# Patient Record
Sex: MALE | Race: OTHER | HISPANIC OR LATINO | URBAN - METROPOLITAN AREA
[De-identification: names, ages, dates, MRNs, and addresses within clinical notes are randomized per-mention and may not be internally consistent; named-entity substitution may affect disease eponyms.]

---

## 2017-07-09 ENCOUNTER — EMERGENCY (EMERGENCY)
Facility: HOSPITAL | Age: 47
LOS: 1 days | Discharge: HOME | End: 2017-07-09
Attending: EMERGENCY MEDICINE
Payer: SELF-PAY

## 2017-07-09 VITALS
HEIGHT: 63 IN | SYSTOLIC BLOOD PRESSURE: 149 MMHG | HEART RATE: 70 BPM | WEIGHT: 158.07 LBS | OXYGEN SATURATION: 98 % | RESPIRATION RATE: 18 BRPM | DIASTOLIC BLOOD PRESSURE: 83 MMHG | TEMPERATURE: 97 F

## 2017-07-09 PROCEDURE — 70450 CT HEAD/BRAIN W/O DYE: CPT

## 2017-07-09 PROCEDURE — 99284 EMERGENCY DEPT VISIT MOD MDM: CPT | Mod: 25

## 2017-07-09 PROCEDURE — T1013: CPT

## 2017-07-09 PROCEDURE — 70450 CT HEAD/BRAIN W/O DYE: CPT | Mod: 26

## 2017-07-09 PROCEDURE — 99284 EMERGENCY DEPT VISIT MOD MDM: CPT

## 2017-07-09 RX ORDER — MECLIZINE HCL 12.5 MG
1 TABLET ORAL
Qty: 30 | Refills: 0
Start: 2017-07-09 | End: 2017-07-19

## 2017-07-09 RX ORDER — MECLIZINE HCL 12.5 MG
25 TABLET ORAL ONCE
Qty: 0 | Refills: 0 | Status: COMPLETED | OUTPATIENT
Start: 2017-07-09 | End: 2017-07-09

## 2017-07-09 RX ADMIN — Medication 25 MILLIGRAM(S): at 15:28

## 2017-07-09 NOTE — ED PROVIDER NOTE - ATTENDING CONTRIBUTION TO CARE
46 y/o M seen and evaluated with P for dizziness = vertigo  slight headache but neuro intact  denies trauma, blood thinners  Vitals = wnl, exam = as documeted, neuro = intact  I = vertigo, peripheral; treat with meclizine and reassess, consider HCT  outpatient f/u as instructed.

## 2017-07-09 NOTE — ED ADULT TRIAGE NOTE - CHIEF COMPLAINT QUOTE
c/o dizziness, " my head feels warm" which is making him vomit. x 4 days." was told at pharmacy to drink something to feel better and its not working" " over heating of the brain "

## 2017-07-09 NOTE — ED PROVIDER NOTE - PROGRESS NOTE DETAILS
NP NOTE:  Dizziness resolved.  CT scan unremarkable.  Will d/c home with rx meclazine and refer to [facility] for f/u.

## 2017-07-09 NOTE — ED PROVIDER NOTE - NEUROLOGICAL, MLM
Alert and oriented, no focal deficits, no motor or sensory deficits. No ataxia or pronator, negative Romberg

## 2017-11-27 ENCOUNTER — EMERGENCY (EMERGENCY)
Facility: HOSPITAL | Age: 47
LOS: 1 days | Discharge: HOME | End: 2017-11-27
Attending: EMERGENCY MEDICINE | Admitting: EMERGENCY MEDICINE
Payer: SELF-PAY

## 2017-11-27 VITALS
DIASTOLIC BLOOD PRESSURE: 81 MMHG | WEIGHT: 149.91 LBS | SYSTOLIC BLOOD PRESSURE: 152 MMHG | RESPIRATION RATE: 18 BRPM | OXYGEN SATURATION: 98 % | HEART RATE: 72 BPM | HEIGHT: 62 IN | TEMPERATURE: 98 F

## 2017-11-27 PROCEDURE — T1013: CPT

## 2017-11-27 PROCEDURE — 99283 EMERGENCY DEPT VISIT LOW MDM: CPT

## 2017-11-27 PROCEDURE — 99283 EMERGENCY DEPT VISIT LOW MDM: CPT | Mod: 25

## 2017-11-27 PROCEDURE — 73030 X-RAY EXAM OF SHOULDER: CPT | Mod: 26,LT

## 2017-11-27 PROCEDURE — 73030 X-RAY EXAM OF SHOULDER: CPT

## 2017-11-27 RX ORDER — IBUPROFEN 200 MG
1 TABLET ORAL
Qty: 15 | Refills: 0
Start: 2017-11-27 | End: 2017-12-02

## 2017-11-27 RX ORDER — IBUPROFEN 200 MG
800 TABLET ORAL ONCE
Qty: 0 | Refills: 0 | Status: COMPLETED | OUTPATIENT
Start: 2017-11-27 | End: 2017-11-27

## 2017-11-27 RX ADMIN — Medication 800 MILLIGRAM(S): at 11:40

## 2017-11-27 RX ADMIN — Medication 800 MILLIGRAM(S): at 11:10

## 2017-11-27 NOTE — ED STATDOCS - PROGRESS NOTE DETAILS
X-ray of shoulder WNL. No acute fracture noted. no widening of A/C joint. Normal ROM present , Normal strength. Pt states some relief with pain medication in ED. Pt will F/U with Orthopedic  clinic as discussed.

## 2017-11-27 NOTE — ED ADULT TRIAGE NOTE - CHIEF COMPLAINT QUOTE
"I was working and I felt like I tore something, I feel like pain and my arm hurts all the way down the bone and my pointer finger is numb. " Pt states this happened 3-4 days ago.

## 2017-11-27 NOTE — ED ADULT NURSE NOTE - OBJECTIVE STATEMENT
pt presents to ED with left shoulder pain radiating to right hand x two weeks s/p lifting something heavy. pt sc/o numbness to left index finger. +ROM noted. a&ox3, breathing is even and unlabored. a&ox3. will continue to monitor and reassess

## 2017-11-27 NOTE — ED STATDOCS - ATTENDING CONTRIBUTION TO CARE
I, Sotero Vega, performed the initial face to face bedside interview with this patient regarding history of present illness, review of symptoms and relevant past medical, social and family history.  I completed an independent physical examination.  I was the provider who initially evaluated this patient.  The history, relevant review of systems, past medical and surgical history, medical decision making, and physical examination was documented by the scribe in my presence and I attest to the accuracy of the documentation. Follow-up on ordered tests (ie labs, radiologic studies) and re-evaluation of the patient's status has been communicated to the ACP.  Disposition of the patient will be based on test outcome and response to ED interventions.

## 2017-11-27 NOTE — ED STATDOCS - CARE PLAN
Principal Discharge DX:	Acute pain of left shoulder  Instructions for follow-up, activity and diet:	Continue with pain medication and F/u with Medical clinic and Orthopedic.

## 2017-11-27 NOTE — ED STATDOCS - OBJECTIVE STATEMENT
This is a 46 y/o M presents to ED c/o L shoulder pain x 6 days. Denies N/V/D, fever, chills, SOB, CP, difficulty breathing, HA, numbness, tingling and abd pain.  Pt states he was lifting a wheel barrel full of stones for work and felt as if he strained his shoulder by the end of the day. On piroxicam. R hand dominant.  : Evy This is a 46 y/o M presents to ED c/o L shoulder pain x 6 days. Pt reports heavy lifting at work last week:  he was lifting a wheel barrel full of stones at work and noted shoulder pain at the end of the day, no acute injury.  Reports worsening symptoms since.  Obtain medications at [place]. Denies prior shoulder problems.  R hand dominant.  : Evy

## 2019-04-01 ENCOUNTER — EMERGENCY (EMERGENCY)
Facility: HOSPITAL | Age: 49
LOS: 1 days | End: 2019-04-01
Attending: STUDENT IN AN ORGANIZED HEALTH CARE EDUCATION/TRAINING PROGRAM
Payer: SELF-PAY

## 2019-04-01 VITALS
RESPIRATION RATE: 18 BRPM | SYSTOLIC BLOOD PRESSURE: 97 MMHG | DIASTOLIC BLOOD PRESSURE: 61 MMHG | WEIGHT: 145.06 LBS | OXYGEN SATURATION: 100 % | HEIGHT: 62 IN | HEART RATE: 59 BPM | TEMPERATURE: 98 F

## 2019-04-01 LAB
ALBUMIN SERPL ELPH-MCNC: 4.7 G/DL — SIGNIFICANT CHANGE UP (ref 3.3–5.2)
ALP SERPL-CCNC: 88 U/L — SIGNIFICANT CHANGE UP (ref 40–120)
ALT FLD-CCNC: 43 U/L — HIGH
ANION GAP SERPL CALC-SCNC: 15 MMOL/L — SIGNIFICANT CHANGE UP (ref 5–17)
AST SERPL-CCNC: 142 U/L — HIGH
BASOPHILS # BLD AUTO: 0 K/UL — SIGNIFICANT CHANGE UP (ref 0–0.2)
BASOPHILS NFR BLD AUTO: 0.2 % — SIGNIFICANT CHANGE UP (ref 0–2)
BILIRUB SERPL-MCNC: 0.8 MG/DL — SIGNIFICANT CHANGE UP (ref 0.4–2)
BUN SERPL-MCNC: 15 MG/DL — SIGNIFICANT CHANGE UP (ref 8–20)
CALCIUM SERPL-MCNC: 9.8 MG/DL — SIGNIFICANT CHANGE UP (ref 8.6–10.2)
CHLORIDE SERPL-SCNC: 102 MMOL/L — SIGNIFICANT CHANGE UP (ref 98–107)
CO2 SERPL-SCNC: 22 MMOL/L — SIGNIFICANT CHANGE UP (ref 22–29)
CREAT SERPL-MCNC: 0.62 MG/DL — SIGNIFICANT CHANGE UP (ref 0.5–1.3)
EOSINOPHIL # BLD AUTO: 0 K/UL — SIGNIFICANT CHANGE UP (ref 0–0.5)
EOSINOPHIL NFR BLD AUTO: 0.3 % — SIGNIFICANT CHANGE UP (ref 0–5)
GLUCOSE SERPL-MCNC: 137 MG/DL — HIGH (ref 70–115)
HCT VFR BLD CALC: 44.1 % — SIGNIFICANT CHANGE UP (ref 42–52)
HGB BLD-MCNC: 15.2 G/DL — SIGNIFICANT CHANGE UP (ref 14–18)
LIDOCAIN IGE QN: 19 U/L — LOW (ref 22–51)
LYMPHOCYTES # BLD AUTO: 1.3 K/UL — SIGNIFICANT CHANGE UP (ref 1–4.8)
LYMPHOCYTES # BLD AUTO: 7.9 % — LOW (ref 20–55)
MCHC RBC-ENTMCNC: 31.3 PG — HIGH (ref 27–31)
MCHC RBC-ENTMCNC: 34.5 G/DL — SIGNIFICANT CHANGE UP (ref 32–36)
MCV RBC AUTO: 90.9 FL — SIGNIFICANT CHANGE UP (ref 80–94)
MONOCYTES # BLD AUTO: 0.8 K/UL — SIGNIFICANT CHANGE UP (ref 0–0.8)
MONOCYTES NFR BLD AUTO: 4.7 % — SIGNIFICANT CHANGE UP (ref 3–10)
NEUTROPHILS # BLD AUTO: 13.8 K/UL — HIGH (ref 1.8–8)
NEUTROPHILS NFR BLD AUTO: 86.4 % — HIGH (ref 37–73)
PLATELET # BLD AUTO: 203 K/UL — SIGNIFICANT CHANGE UP (ref 150–400)
POTASSIUM SERPL-MCNC: 3.9 MMOL/L — SIGNIFICANT CHANGE UP (ref 3.5–5.3)
POTASSIUM SERPL-SCNC: 3.9 MMOL/L — SIGNIFICANT CHANGE UP (ref 3.5–5.3)
PROT SERPL-MCNC: 7.9 G/DL — SIGNIFICANT CHANGE UP (ref 6.6–8.7)
RBC # BLD: 4.85 M/UL — SIGNIFICANT CHANGE UP (ref 4.6–6.2)
RBC # FLD: 12.9 % — SIGNIFICANT CHANGE UP (ref 11–15.6)
SODIUM SERPL-SCNC: 139 MMOL/L — SIGNIFICANT CHANGE UP (ref 135–145)
WBC # BLD: 16 K/UL — HIGH (ref 4.8–10.8)
WBC # FLD AUTO: 16 K/UL — HIGH (ref 4.8–10.8)

## 2019-04-01 PROCEDURE — 99284 EMERGENCY DEPT VISIT MOD MDM: CPT

## 2019-04-01 RX ORDER — SODIUM CHLORIDE 9 MG/ML
3 INJECTION INTRAMUSCULAR; INTRAVENOUS; SUBCUTANEOUS ONCE
Qty: 0 | Refills: 0 | Status: COMPLETED | OUTPATIENT
Start: 2019-04-01 | End: 2019-04-01

## 2019-04-01 RX ORDER — KETOROLAC TROMETHAMINE 30 MG/ML
15 SYRINGE (ML) INJECTION ONCE
Qty: 0 | Refills: 0 | Status: DISCONTINUED | OUTPATIENT
Start: 2019-04-01 | End: 2019-04-01

## 2019-04-01 RX ORDER — LIDOCAINE 4 G/100G
10 CREAM TOPICAL ONCE
Qty: 0 | Refills: 0 | Status: COMPLETED | OUTPATIENT
Start: 2019-04-01 | End: 2019-04-01

## 2019-04-01 RX ORDER — FAMOTIDINE 10 MG/ML
20 INJECTION INTRAVENOUS ONCE
Qty: 0 | Refills: 0 | Status: COMPLETED | OUTPATIENT
Start: 2019-04-01 | End: 2019-04-01

## 2019-04-01 RX ORDER — SODIUM CHLORIDE 9 MG/ML
1000 INJECTION INTRAMUSCULAR; INTRAVENOUS; SUBCUTANEOUS ONCE
Qty: 0 | Refills: 0 | Status: COMPLETED | OUTPATIENT
Start: 2019-04-01 | End: 2019-04-01

## 2019-04-01 RX ORDER — ONDANSETRON 8 MG/1
4 TABLET, FILM COATED ORAL ONCE
Qty: 0 | Refills: 0 | Status: COMPLETED | OUTPATIENT
Start: 2019-04-01 | End: 2019-04-01

## 2019-04-01 RX ADMIN — Medication 15 MILLIGRAM(S): at 21:55

## 2019-04-01 RX ADMIN — Medication 15 MILLIGRAM(S): at 22:15

## 2019-04-01 RX ADMIN — LIDOCAINE 10 MILLILITER(S): 4 CREAM TOPICAL at 21:56

## 2019-04-01 RX ADMIN — FAMOTIDINE 20 MILLIGRAM(S): 10 INJECTION INTRAVENOUS at 21:55

## 2019-04-01 RX ADMIN — ONDANSETRON 4 MILLIGRAM(S): 8 TABLET, FILM COATED ORAL at 21:55

## 2019-04-01 RX ADMIN — SODIUM CHLORIDE 3 MILLILITER(S): 9 INJECTION INTRAMUSCULAR; INTRAVENOUS; SUBCUTANEOUS at 22:09

## 2019-04-01 RX ADMIN — SODIUM CHLORIDE 1000 MILLILITER(S): 9 INJECTION INTRAMUSCULAR; INTRAVENOUS; SUBCUTANEOUS at 23:00

## 2019-04-01 RX ADMIN — SODIUM CHLORIDE 1000 MILLILITER(S): 9 INJECTION INTRAMUSCULAR; INTRAVENOUS; SUBCUTANEOUS at 21:54

## 2019-04-01 RX ADMIN — Medication 30 MILLILITER(S): at 21:56

## 2019-04-01 NOTE — ED PROVIDER NOTE - CLINICAL SUMMARY MEDICAL DECISION MAKING FREE TEXT BOX
Pt with acute stomach upset likely enteritis secondary to food intake, will treat with hydration, pain control, check labs, and reeval

## 2019-04-01 NOTE — ED PROVIDER NOTE - OBJECTIVE STATEMENT
50 y/o M, with no pertinent medical hx, presents to the ED c/o intermittent epigastric pain, onset yesterday afternoon.  Pain is burning in nature and radiates up to the throat.  Associated sx include N/V/D.  Pt notes 3-4 episodes of non bloody, non bilious emesis, as well as non bloody diarrhea since approximately 4:00PM today.  Self medicating with Motrin, with no relief.  Last dose 2 hrs ago.  Pt states that sx onset yesterday sx onset yesterday afternoon after eating soup.  Sx resolved and then returned this afternoon after eating.  Notes similar sx last year and states that he was seen in the ED.  Denies fever, chills, chest pain, SOB, cough, back pain, or HA.  HPI translated by ED

## 2019-04-01 NOTE — ED PROVIDER NOTE - PROGRESS NOTE DETAILS
Pt reevaluated with ED  at bedside, pt states that he is feeling better after medications, pt stable for d/c home and outpatient follow up

## 2019-04-01 NOTE — ED PROVIDER NOTE - CONSTITUTIONAL, MLM
normal... ill appearing, uncomfortable, well nourished, awake, alert, oriented to person, place, time/situation

## 2019-04-01 NOTE — ED ADULT TRIAGE NOTE - CHIEF COMPLAINT QUOTE
Pt c/o abdominal pain, vomiting, diarrhea and fever, did not take temperature but took motrin at 4:00, states he thinks the soup he ate yesterday is making him sick

## 2019-04-01 NOTE — ED ADULT NURSE NOTE - OBJECTIVE STATEMENT
Assumed care of patient at 2200, alert and oriented x4, c/o abdominal pain, n.v.d since yesterday. IV placed labs sent, medications given per MD order. Safely maintained, no s/s of distress noted at this time.

## 2019-04-02 VITALS
DIASTOLIC BLOOD PRESSURE: 82 MMHG | OXYGEN SATURATION: 98 % | TEMPERATURE: 100 F | RESPIRATION RATE: 18 BRPM | HEART RATE: 67 BPM | SYSTOLIC BLOOD PRESSURE: 126 MMHG

## 2019-04-02 PROCEDURE — T1013: CPT

## 2019-04-02 PROCEDURE — 36415 COLL VENOUS BLD VENIPUNCTURE: CPT

## 2019-04-02 PROCEDURE — 96361 HYDRATE IV INFUSION ADD-ON: CPT

## 2019-04-02 PROCEDURE — 80053 COMPREHEN METABOLIC PANEL: CPT

## 2019-04-02 PROCEDURE — 85027 COMPLETE CBC AUTOMATED: CPT

## 2019-04-02 PROCEDURE — 99284 EMERGENCY DEPT VISIT MOD MDM: CPT | Mod: 25

## 2019-04-02 PROCEDURE — 83690 ASSAY OF LIPASE: CPT

## 2019-04-02 PROCEDURE — 96374 THER/PROPH/DIAG INJ IV PUSH: CPT

## 2019-04-02 PROCEDURE — 96375 TX/PRO/DX INJ NEW DRUG ADDON: CPT

## 2019-04-02 RX ORDER — ONDANSETRON 8 MG/1
1 TABLET, FILM COATED ORAL
Qty: 16 | Refills: 0
Start: 2019-04-02 | End: 2019-04-05

## 2019-04-02 RX ORDER — FAMOTIDINE 10 MG/ML
1 INJECTION INTRAVENOUS
Qty: 7 | Refills: 0
Start: 2019-04-02 | End: 2019-04-08

## 2019-04-02 RX ORDER — ONDANSETRON 8 MG/1
4 TABLET, FILM COATED ORAL ONCE
Qty: 0 | Refills: 0 | Status: COMPLETED | OUTPATIENT
Start: 2019-04-02 | End: 2019-04-02

## 2019-04-02 NOTE — ED ADULT NURSE REASSESSMENT NOTE - NS ED NURSE REASSESS COMMENT FT1
Discharge instruction reviewed with patient by Dr Sears Via ED  harriet. Patient verbalized understanding of all instructions given. No distress. Pt reports he feels better, but is still nauseas. Dr Sears aware. IV removed, safety maintained.

## 2020-03-17 ENCOUNTER — INPATIENT (INPATIENT)
Facility: HOSPITAL | Age: 50
LOS: 0 days | Discharge: HOME | DRG: 287 | End: 2020-03-18
Attending: STUDENT IN AN ORGANIZED HEALTH CARE EDUCATION/TRAINING PROGRAM | Admitting: INTERNAL MEDICINE
Payer: SELF-PAY

## 2020-03-17 VITALS
RESPIRATION RATE: 18 BRPM | HEART RATE: 80 BPM | DIASTOLIC BLOOD PRESSURE: 86 MMHG | TEMPERATURE: 97 F | SYSTOLIC BLOOD PRESSURE: 145 MMHG

## 2020-03-17 LAB
ALBUMIN SERPL ELPH-MCNC: 4 G/DL — SIGNIFICANT CHANGE UP (ref 3.3–5.2)
ALP SERPL-CCNC: 89 U/L — SIGNIFICANT CHANGE UP (ref 40–120)
ALT FLD-CCNC: 54 U/L — HIGH
ANION GAP SERPL CALC-SCNC: 12 MMOL/L — SIGNIFICANT CHANGE UP (ref 5–17)
AST SERPL-CCNC: 33 U/L — SIGNIFICANT CHANGE UP
BASOPHILS # BLD AUTO: 0.06 K/UL — SIGNIFICANT CHANGE UP (ref 0–0.2)
BASOPHILS NFR BLD AUTO: 0.6 % — SIGNIFICANT CHANGE UP (ref 0–2)
BILIRUB SERPL-MCNC: 0.4 MG/DL — SIGNIFICANT CHANGE UP (ref 0.4–2)
BUN SERPL-MCNC: 16 MG/DL — SIGNIFICANT CHANGE UP (ref 8–20)
CALCIUM SERPL-MCNC: 8.9 MG/DL — SIGNIFICANT CHANGE UP (ref 8.6–10.2)
CHLORIDE SERPL-SCNC: 102 MMOL/L — SIGNIFICANT CHANGE UP (ref 98–107)
CO2 SERPL-SCNC: 22 MMOL/L — SIGNIFICANT CHANGE UP (ref 22–29)
CREAT SERPL-MCNC: 0.58 MG/DL — SIGNIFICANT CHANGE UP (ref 0.5–1.3)
EOSINOPHIL # BLD AUTO: 1.65 K/UL — HIGH (ref 0–0.5)
EOSINOPHIL NFR BLD AUTO: 17.5 % — HIGH (ref 0–6)
GLUCOSE SERPL-MCNC: 107 MG/DL — HIGH (ref 70–99)
HCT VFR BLD CALC: 43.1 % — SIGNIFICANT CHANGE UP (ref 39–50)
HGB BLD-MCNC: 14.6 G/DL — SIGNIFICANT CHANGE UP (ref 13–17)
IMM GRANULOCYTES NFR BLD AUTO: 0.7 % — SIGNIFICANT CHANGE UP (ref 0–1.5)
LYMPHOCYTES # BLD AUTO: 2.69 K/UL — SIGNIFICANT CHANGE UP (ref 1–3.3)
LYMPHOCYTES # BLD AUTO: 28.5 % — SIGNIFICANT CHANGE UP (ref 13–44)
MCHC RBC-ENTMCNC: 31.3 PG — SIGNIFICANT CHANGE UP (ref 27–34)
MCHC RBC-ENTMCNC: 33.9 GM/DL — SIGNIFICANT CHANGE UP (ref 32–36)
MCV RBC AUTO: 92.3 FL — SIGNIFICANT CHANGE UP (ref 80–100)
MONOCYTES # BLD AUTO: 0.81 K/UL — SIGNIFICANT CHANGE UP (ref 0–0.9)
MONOCYTES NFR BLD AUTO: 8.6 % — SIGNIFICANT CHANGE UP (ref 2–14)
NEUTROPHILS # BLD AUTO: 4.16 K/UL — SIGNIFICANT CHANGE UP (ref 1.8–7.4)
NEUTROPHILS NFR BLD AUTO: 44.1 % — SIGNIFICANT CHANGE UP (ref 43–77)
PLATELET # BLD AUTO: 209 K/UL — SIGNIFICANT CHANGE UP (ref 150–400)
POTASSIUM SERPL-MCNC: 3.8 MMOL/L — SIGNIFICANT CHANGE UP (ref 3.5–5.3)
POTASSIUM SERPL-SCNC: 3.8 MMOL/L — SIGNIFICANT CHANGE UP (ref 3.5–5.3)
PROT SERPL-MCNC: 6.6 G/DL — SIGNIFICANT CHANGE UP (ref 6.6–8.7)
RAPID RVP RESULT: SIGNIFICANT CHANGE UP
RBC # BLD: 4.67 M/UL — SIGNIFICANT CHANGE UP (ref 4.2–5.8)
RBC # FLD: 12.4 % — SIGNIFICANT CHANGE UP (ref 10.3–14.5)
SODIUM SERPL-SCNC: 136 MMOL/L — SIGNIFICANT CHANGE UP (ref 135–145)
TROPONIN T SERPL-MCNC: <0.01 NG/ML — SIGNIFICANT CHANGE UP (ref 0–0.06)
TROPONIN T SERPL-MCNC: <0.01 NG/ML — SIGNIFICANT CHANGE UP (ref 0–0.06)
WBC # BLD: 9.44 K/UL — SIGNIFICANT CHANGE UP (ref 3.8–10.5)
WBC # FLD AUTO: 9.44 K/UL — SIGNIFICANT CHANGE UP (ref 3.8–10.5)

## 2020-03-17 PROCEDURE — 99220: CPT

## 2020-03-17 PROCEDURE — 71046 X-RAY EXAM CHEST 2 VIEWS: CPT | Mod: 26

## 2020-03-17 RX ORDER — SODIUM CHLORIDE 9 MG/ML
1000 INJECTION INTRAMUSCULAR; INTRAVENOUS; SUBCUTANEOUS ONCE
Refills: 0 | Status: DISCONTINUED | OUTPATIENT
Start: 2020-03-17 | End: 2020-03-17

## 2020-03-17 RX ORDER — IBUPROFEN 200 MG
600 TABLET ORAL ONCE
Refills: 0 | Status: COMPLETED | OUTPATIENT
Start: 2020-03-17 | End: 2020-03-17

## 2020-03-17 RX ORDER — ASPIRIN/CALCIUM CARB/MAGNESIUM 324 MG
325 TABLET ORAL ONCE
Refills: 0 | Status: COMPLETED | OUTPATIENT
Start: 2020-03-17 | End: 2020-03-17

## 2020-03-17 RX ORDER — FAMOTIDINE 10 MG/ML
20 INJECTION INTRAVENOUS ONCE
Refills: 0 | Status: COMPLETED | OUTPATIENT
Start: 2020-03-17 | End: 2020-03-17

## 2020-03-17 RX ADMIN — Medication 325 MILLIGRAM(S): at 23:50

## 2020-03-17 RX ADMIN — FAMOTIDINE 20 MILLIGRAM(S): 10 INJECTION INTRAVENOUS at 17:09

## 2020-03-17 RX ADMIN — Medication 600 MILLIGRAM(S): at 17:09

## 2020-03-17 NOTE — ED PROVIDER NOTE - CLINICAL SUMMARY MEDICAL DECISION MAKING FREE TEXT BOX
well appearing male c/o headche, sore throat, burning chest/ abd pain/ low back pain improving since this morning.  PE unremarkable.  EKG with TWI and pt without known cardiac history;  will f/u labs, CXR, obtain cardiology consult, reeval

## 2020-03-17 NOTE — ED STATDOCS - PHYSICAL EXAMINATION
Gen: NAD, AOx3  Head: NCAT  Lung: CTAB, no respiratory distress, no wheezing, rales, rhonchi  CV: normal s1/s2, rrr, no murmurs, Normal perfusion, pulses 2+ throughout  Abd: (+) Mild tenderness in epigastric region. soft, ND, no CVA tenderness  MSK: No edema, no visible deformities, full range of motion in all 4 extremities  Neuro: No focal neurologic deficits  Skin: No rash   Psych: normal affect

## 2020-03-17 NOTE — ED CDU PROVIDER INITIAL DAY NOTE - ATTENDING CONTRIBUTION TO CARE
abdominal pain radiating into chest, + wall motion abnormality on TTE.  CDU for cardiac CTA and SS cards eval in AM.

## 2020-03-17 NOTE — ED STATDOCS - CLINICAL SUMMARY MEDICAL DECISION MAKING FREE TEXT BOX
51 y/o male with headache sore throat, epigastric pain, suspect viral syndrome. Does not meet COVID-19 testing criteria at this time as patient is without fever or respiratory symptoms. Check labs, EKG, CXR, Motrin, re-assess. 49 y/o male with headache sore throat, chest pain, epigastric pain, suspect viral syndrome. Does not meet COVID-19 testing criteria at this time as patient is without fever or respiratory symptoms. Check labs, EKG, CXR, Motrin, re-assess.

## 2020-03-17 NOTE — ED PROVIDER NOTE - PROGRESS NOTE DETAILS
Case d/w Dr. Julio who had recommended TTE: He read report and noted a wall motion abnormality recommended CT coronary in AM. Pt notified of findings and plan and agrees. Denies any CP at this time.

## 2020-03-17 NOTE — ED ADULT NURSE NOTE - OBJECTIVE STATEMENT
pt presents with c/o headache  and generalized URI symptoms. for a few days. denies fever and chills. + c/o lower back pain. + generalized abd pain denies nausea, vomiting or diarrhea. denies recent travel or sick contacts.

## 2020-03-17 NOTE — ED PROVIDER NOTE - OBJECTIVE STATEMENT
50yoM with no sig PMH pw nasal discomfort and sneezing after breathing in air from outside yesterday; this morning he woke up with a headache, sore throat, low back pain, and burning sensation moving up and down through the chest to the upper abdomen, he thought could be gastritis.  He reports he is feeling a lot better now compared to this morning;  Denies any difficulty breathing or cough.  Pt denies any chest pain with exertion. Denies seeing a cardiologist. Of note, father developed CAD in his 50s and passed from a heart attack in his 60s. Pt denies any recent travel or known sick contacts. Notes occasional lower back pain

## 2020-03-17 NOTE — ED CDU PROVIDER INITIAL DAY NOTE - MEDICAL DECISION MAKING DETAILS
50m with abdominal pain radiating into chest, + wall motion abnormality on TTE. Placed in CDU for cardiac CTA and SS cards eval in AM.

## 2020-03-17 NOTE — ED STATDOCS - OBJECTIVE STATEMENT
51 y/o male with no PMHx presents to ED c/o headache. Patient states he has a headache, abdominal pain, intermittent cough, back pain, and sore throat that all started this morning.     Denies fever, SOB 49 y/o male with no PMHx presents to ED c/o headache. Patient states he has a headache, chest pain, abdominal pain, intermittent cough, back pain, and sore throat that all started this morning. +sick contact at home. No contact with COVID patient.     Denies fever, SOB

## 2020-03-17 NOTE — ED STATDOCS - PROGRESS NOTE DETAILS
Patient with diffuse T wave inversions, hyperacute T wave in V2, concerning for ischemia. Will move to main ED on monitor. Kanwal Santana DO

## 2020-03-18 ENCOUNTER — TRANSCRIPTION ENCOUNTER (OUTPATIENT)
Age: 50
End: 2020-03-18

## 2020-03-18 VITALS
RESPIRATION RATE: 16 BRPM | OXYGEN SATURATION: 100 % | DIASTOLIC BLOOD PRESSURE: 86 MMHG | HEART RATE: 95 BPM | SYSTOLIC BLOOD PRESSURE: 143 MMHG

## 2020-03-18 DIAGNOSIS — R07.9 CHEST PAIN, UNSPECIFIED: ICD-10-CM

## 2020-03-18 LAB — TROPONIN T SERPL-MCNC: <0.01 NG/ML — SIGNIFICANT CHANGE UP (ref 0–0.06)

## 2020-03-18 PROCEDURE — G0378: CPT

## 2020-03-18 PROCEDURE — 93307 TTE W/O DOPPLER COMPLETE: CPT

## 2020-03-18 PROCEDURE — 99222 1ST HOSP IP/OBS MODERATE 55: CPT

## 2020-03-18 PROCEDURE — 87581 M.PNEUMON DNA AMP PROBE: CPT

## 2020-03-18 PROCEDURE — 99285 EMERGENCY DEPT VISIT HI MDM: CPT | Mod: 25

## 2020-03-18 PROCEDURE — 80053 COMPREHEN METABOLIC PANEL: CPT

## 2020-03-18 PROCEDURE — 93005 ELECTROCARDIOGRAM TRACING: CPT

## 2020-03-18 PROCEDURE — 84484 ASSAY OF TROPONIN QUANT: CPT

## 2020-03-18 PROCEDURE — 93458 L HRT ARTERY/VENTRICLE ANGIO: CPT | Mod: 26

## 2020-03-18 PROCEDURE — 96374 THER/PROPH/DIAG INJ IV PUSH: CPT | Mod: XU

## 2020-03-18 PROCEDURE — 87486 CHLMYD PNEUM DNA AMP PROBE: CPT

## 2020-03-18 PROCEDURE — 99222 1ST HOSP IP/OBS MODERATE 55: CPT | Mod: AI

## 2020-03-18 PROCEDURE — 87633 RESP VIRUS 12-25 TARGETS: CPT

## 2020-03-18 PROCEDURE — 99152 MOD SED SAME PHYS/QHP 5/>YRS: CPT

## 2020-03-18 PROCEDURE — 93458 L HRT ARTERY/VENTRICLE ANGIO: CPT

## 2020-03-18 PROCEDURE — 75574 CT ANGIO HRT W/3D IMAGE: CPT | Mod: 26

## 2020-03-18 PROCEDURE — 75574 CT ANGIO HRT W/3D IMAGE: CPT

## 2020-03-18 PROCEDURE — 71046 X-RAY EXAM CHEST 2 VIEWS: CPT

## 2020-03-18 PROCEDURE — 87798 DETECT AGENT NOS DNA AMP: CPT

## 2020-03-18 PROCEDURE — 85027 COMPLETE CBC AUTOMATED: CPT

## 2020-03-18 PROCEDURE — T1013: CPT

## 2020-03-18 PROCEDURE — C1769: CPT

## 2020-03-18 PROCEDURE — 36415 COLL VENOUS BLD VENIPUNCTURE: CPT

## 2020-03-18 PROCEDURE — 99217: CPT

## 2020-03-18 PROCEDURE — C1887: CPT

## 2020-03-18 PROCEDURE — C1894: CPT

## 2020-03-18 PROCEDURE — 83690 ASSAY OF LIPASE: CPT

## 2020-03-18 RX ORDER — CARVEDILOL PHOSPHATE 80 MG/1
3.12 CAPSULE, EXTENDED RELEASE ORAL EVERY 12 HOURS
Refills: 0 | Status: DISCONTINUED | OUTPATIENT
Start: 2020-03-18 | End: 2020-03-18

## 2020-03-18 RX ORDER — ASPIRIN/CALCIUM CARB/MAGNESIUM 324 MG
81 TABLET ORAL DAILY
Refills: 0 | Status: DISCONTINUED | OUTPATIENT
Start: 2020-03-18 | End: 2020-03-18

## 2020-03-18 RX ORDER — METOPROLOL TARTRATE 50 MG
50 TABLET ORAL ONCE
Refills: 0 | Status: COMPLETED | OUTPATIENT
Start: 2020-03-18 | End: 2020-03-18

## 2020-03-18 RX ORDER — FUROSEMIDE 40 MG
20 TABLET ORAL ONCE
Refills: 0 | Status: DISCONTINUED | OUTPATIENT
Start: 2020-03-18 | End: 2020-03-18

## 2020-03-18 RX ORDER — ATORVASTATIN CALCIUM 80 MG/1
80 TABLET, FILM COATED ORAL AT BEDTIME
Refills: 0 | Status: DISCONTINUED | OUTPATIENT
Start: 2020-03-18 | End: 2020-03-18

## 2020-03-18 RX ORDER — ATORVASTATIN CALCIUM 80 MG/1
1 TABLET, FILM COATED ORAL
Qty: 30 | Refills: 0
Start: 2020-03-18 | End: 2020-04-16

## 2020-03-18 RX ORDER — ASPIRIN/CALCIUM CARB/MAGNESIUM 324 MG
1 TABLET ORAL
Qty: 0 | Refills: 0 | DISCHARGE
Start: 2020-03-18

## 2020-03-18 RX ORDER — METOPROLOL TARTRATE 50 MG
25 TABLET ORAL ONCE
Refills: 0 | Status: DISCONTINUED | OUTPATIENT
Start: 2020-03-18 | End: 2020-03-18

## 2020-03-18 RX ORDER — LISINOPRIL 2.5 MG/1
2.5 TABLET ORAL DAILY
Refills: 0 | Status: DISCONTINUED | OUTPATIENT
Start: 2020-03-18 | End: 2020-03-18

## 2020-03-18 RX ORDER — CARVEDILOL PHOSPHATE 80 MG/1
1 CAPSULE, EXTENDED RELEASE ORAL
Qty: 60 | Refills: 0
Start: 2020-03-18 | End: 2020-04-16

## 2020-03-18 RX ORDER — LISINOPRIL 2.5 MG/1
1 TABLET ORAL
Qty: 30 | Refills: 0
Start: 2020-03-18 | End: 2020-04-16

## 2020-03-18 RX ADMIN — Medication 81 MILLIGRAM(S): at 17:48

## 2020-03-18 RX ADMIN — CARVEDILOL PHOSPHATE 3.12 MILLIGRAM(S): 80 CAPSULE, EXTENDED RELEASE ORAL at 17:48

## 2020-03-18 NOTE — ED ADULT NURSE REASSESSMENT NOTE - GENERAL PATIENT STATE
resting/sleeping
resting/sleeping/smiling/interactive/comfortable appearance/cooperative
comfortable appearance/cooperative

## 2020-03-18 NOTE — ED CDU PROVIDER DISPOSITION NOTE - CLINICAL COURSE
Pt is a 51 y/o male with no pertinent medical history who presents to [facility] for headache, chest pain, and stomachache. Pt states yesterday he has been experiencing a various symptoms including headache, stomachache, lower back pain. For past 3-4 days, he c/o chest pain. Chest Pain is described as pinpricking sensation.,mild,nonraidating, intermittent, left sided. Pt states pain is worse at night. Pt denies associated SOB, palpitations, or bilateral leg swelling. Pt came to [facility], ECG completed revealed SB HR @ 57, diffuse T wave abnormalities, Tropx3 negative, chest xray negative. Pt echo revealed reduced EF of 45-50%. Pt Denies fever, chills, cough, phlegm production, shortness of breath, dyspnea on exertion, orthopnea, PND, edema,  palpitations, irregular, fast heart beat, nausea, vomiting, melena, rectal bleed, hematuria, lightheadedness, dizziness, syncope, near syncope.  Pt currently denies chest pain as of today. Patient placed in observation had serial troponins and EKGs and cardiac CT and echo.  Plan for cath today.  Case d/w MD Ohebsion.

## 2020-03-18 NOTE — PROGRESS NOTE ADULT - ASSESSMENT
Procedure: [facility]  Diagnosis:  of RCA    1. Ambulate at: 6:00PM    2. Return to bed    3. Follow up with: Dr. Julio    4. Medications:   Aspirin 81 mg daily  Lipitor 80 mg daily  Coreg 3.125 mg BID  Lisinopril 2.5 mg daily Procedure: [facility]  Diagnosis:  of RCA    1. Remove radial band at: 6:00PM    2. Return to bed    3. Follow up with: Dr. Julio    4. Medications:   Aspirin 81 mg daily  Lipitor 80 mg daily  Coreg 3.125 mg BID  Lisinopril 2.5 mg daily

## 2020-03-18 NOTE — DISCHARGE NOTE PROVIDER - CARE PROVIDER_API CALL
Kriss Julio)  Cardiology; Cardiovascular Disease; Internal Medicine  [address] [place] [address]  Phone: [phone]  Fax: [phone]  Follow Up Time: 2 weeks

## 2020-03-18 NOTE — H&P ADULT - NSHPLABSRESULTS_GEN_ALL_CORE
14.6   9.44  )-----------( 209      ( 17 Mar 2020 17:11 )             43.1       03-17    136  |  102  |  16.0  ----------------------------<  107<H>  3.8   |  22.0  |  0.58    Ca    8.9      17 Mar 2020 17:11    TPro  6.6  /  Alb  4.0  /  TBili  0.4  /  DBili  x   /  AST  33  /  ALT  54<H>  /  AlkPhos  89  03-17    CT angio:  IMPRESSION:   CT coronary angiography shows:  LMCA: Normal.  LAD: Widely patent.  LCx: Widely patent.  RCA: Dominant, widely patent proximally and distally. Mid vessel is not well visualized.  Abnormal LV systolic function, LVEF of 21%, regional wall motion abnormalities are present.

## 2020-03-18 NOTE — DISCHARGE NOTE PROVIDER - NSDCMRMEDTOKEN_GEN_ALL_CORE_FT
aspirin 81 mg oral tablet, chewable: 1 tab(s) orally once a day  atorvastatin 80 mg oral tablet: 1 tab(s) orally once a day (at bedtime)  carvedilol 3.125 mg oral tablet: 1 tab(s) orally every 12 hours  lisinopril 2.5 mg oral tablet: 1 tab(s) orally once a day

## 2020-03-18 NOTE — DISCHARGE NOTE NURSING/CASE MANAGEMENT/SOCIAL WORK - PATIENT PORTAL LINK FT
You can access the FollowMyHealth Patient Portal offered by [facility] by registering at the following website: [email or web address]. By joining [employer]’s FollowMyHealth portal, you will also be able to view your health information using other applications (apps) compatible with our system.

## 2020-03-18 NOTE — DISCHARGE NOTE PROVIDER - NSDCFUADDINST_GEN_ALL_CORE_FT
No heavy lifting, driving, sex, tub baths, swimming, or any activity that submerges the lower half of the body in water for 48 hours.  Limited walking and stairs for 48 hours.    Change the bandaid after 24 hours and every 24 hours after that.  Keep the puncture site dry and covered with a bandaid until a scab forms.    Observe the site frequently.  If bleeding or a large lump (the size of a golf ball or bigger) occurs lie flat, apply continuous direct pressure just above the puncture site for at least 10 minutes, and notify your physician immediately.  If the bleeding cannot be controlled, call 911 immediately for assistance.  Notify your physician of pain, swelling or any drainage.    Notify your physician immediately if coldness, numbness, discoloration or pain in your foot occurs. Restricted use with no heavy lifting of affected arm for 48 hours.  No submerging the arm in water for 48 hours.  You may start showering today.  Call your doctor for any bleeding, swelling, loss of sensation in the hand or fingers, or fingers turning blue.  If heavy bleeding or large lumps form, hold pressure at the spot and come to the Emergency Room.

## 2020-03-18 NOTE — CONSULT NOTE ADULT - SUBJECTIVE AND OBJECTIVE BOX
[facility]                                                               Office:  [address] [address]                                                              Telephone: [phone]. Fax:[phone]                                                                        CARDIOLOGY CONSULTATION NOTE                                                                                             Consult requested by:  Dr. Bass  Reason for Consultation: Chest Pain  History obtained by: Patient and medical record   obtained: No    Chief complaint:    Patient is a 50y old  Male who presents with a chief complaint of chest pain       HPI: Pt is a 49 y/o male with no pertinent medical history who presents to [facility] for headache, chest pain, and stomachache. Pt states yesterday he has been experiencing a various symptoms including headache, stomachache, lower back pain. For past 3-4 days, he c/o chest pain. Chest Pain is described as pinpricking sensation.,mild,nonraidating, intermittent, left sided. Pt states pain is worse at night. Pt denies associated SOB, palpitations, or bilateral leg swelling. Pt came to [facility], ECG completed revealed SB HR @ 57, diffuse Twave abnormalities, Tropx3 negative, chest xray negative. Pt echo revealed reduced EF of 45-50%. Pt Denies fever, chills, cough, phlegm production, shortness of breath, dyspnea on exertion, orthopnea, PND, edema,  palpitations, irregular, fast heart beat, nausea, vomiting, melena, rectal bleed, hematuria, lightheadedness, dizziness, syncope, near syncope.   Pt currently denies chest pain as of today.          REVIEW OF SYMPTOMS:     CONSTITUTIONAL: No fever, weight loss, or fatigue  ENMT:  No difficulty hearing, tinnitus, vertigo; No sinus or throat pain  NECK: No pain or stiffness  CARDIOVASCULAR: See HPI  RESPIRATORY: No Dyspnea on exertion, Shortness of breath, cough, wheezing  : No dysuria, no hematuria   GI: No dark color stool, no melena, no diarrhea, no constipation, no abdominal pain   NEURO: No headache, no dizziness, no slurred speech   MUSCULOSKELETAL: No joint pain or swelling; No muscle, back, or extremity pain  PSYCH: No agitation, no anxiety.    ALL OTHER REVIEW OF SYSTEMS ARE NEGATIVE.      PREVIOUS DIAGNOSTIC TESTING  ECHO FINDINGS:< from: TTE Echo Complete w/o Doppler (03.17.20 @ 20:09) >  Summary:   1. Left ventricular ejection fraction, by visual estimation, is 45 to 50%.   2. Mildly decreased global left ventricular systolic function.   3. Multiple left ventricular regional wall motion abnormalities exist. See wall motion findings.   4. Mild thickening of the anterior and posterior mitral valve leaflets.   5. Endocardial visualization was enhanced with intravenous echo contrast.    < end of copied text >        STRESS FINDINGS:      CATHETERIZATION FINDINGS: pending        ALLERGIES: Allergies    No Known Allergies    Intolerances          PAST MEDICAL HISTORY  Chronic knee pain  No Past Medical History      PAST SURGICAL HISTORY  Tooth disorder      FAMILY HISTORY:  Family history of early CAD (Father)       SOCIAL HISTORY:  Denies smoking/alcohol/drugs  CIGARETTES:   quit 20 years ago  ALCOHOL: quit 5-6 years ago  DRUGS: +marijuana use-last use 7 years ago for pain      CURRENT MEDICATIONS:           HOME MEDICATIONS:        Vital Signs Last 24 Hrs  T(C): 36.9 (18 Mar 2020 11:37), Max: 36.9 (17 Mar 2020 19:44)  T(F): 98.5 (18 Mar 2020 11:37), Max: 98.5 (17 Mar 2020 19:44)  HR: 62 (18 Mar 2020 11:37) (62 - 80)  BP: 107/64 (18 Mar 2020 11:37) (107/64 - 145/86)  BP(mean): 97 (17 Mar 2020 19:44) (97 - 97)  RR: 18 (18 Mar 2020 11:37) (17 - 18)  SpO2: 99% (18 Mar 2020 11:37) (98% - 99%)      PHYSICAL EXAM:  Constitutional: Comfortable . No acute distress.   HEENT: Atraumatic and normocephalic , neck is supple . no JVD. No carotid bruit. PEERL   CNS: A&Ox3. No focal deficits. EOMI.   Lymph Nodes: Cervical : Not palpable.  Respiratory: CTAB  Cardiovascular: S1S2 RRR. No murmur/rubs or gallop.  Gastrointestinal: Soft non-tender and non distended . +Bowel sounds. negative Michelle's sign.  Extremities: No edema.   Psychiatric: Calm . no agitation.  Skin: No skin rash/ulcers visualized to face, hands or feet.    Intake and output:   03-17 @ 07:01  -  03-18 @ 07:00  --------------------------------------------------------  IN: 1000 mL / OUT: 0 mL / NET: 1000 mL        LABS:                        14.6   9.44  )-----------( 209      ( 17 Mar 2020 17:11 )             43.1     03-17    136  |  102  |  16.0  ----------------------------<  107<H>  3.8   |  22.0  |  0.58    Ca    8.9      17 Mar 2020 17:11    TPro  6.6  /  Alb  4.0  /  TBili  0.4  /  DBili  x   /  AST  33  /  ALT  54<H>  /  AlkPhos  89  03-17    CARDIAC MARKERS ( 17 Mar 2020 23:37 )  x     / <0.01 ng/mL / x     / x     / x      CARDIAC MARKERS ( 17 Mar 2020 20:06 )  x     / <0.01 ng/mL / x     / x     / x      CARDIAC MARKERS ( 17 Mar 2020 17:11 )  x     / <0.01 ng/mL / x     / x     / x        ;p-BNP=        INTERPRETATION OF TELEMETRY: SB HR @ 50s   ECG: SB HR @ 57, diffuse T wave abnormalities,    RADIOLOGY & ADDITIONAL STUDIES:    X-ray:  < from: Xray Chest 2 Views PA/Lat (03.17.20 @ 14:42) >  INTERPRETATION:  Frontal and lateral chest on March 17, 2020 2:40 PM. Patient has chest pain and sore throat.    Shallow inspiration crowds the chest.    The heart appears normal in size.    The lung fields and pleural surfaces are unremarkable.    The chest is similar to July 19, 2013.    IMPRESSION: Negative chest.    < end of copied text >    CT scan: < from: CT Angio Cardiac w/ IV Cont (03.18.20 @ 09:54) >  Myocardial Function:  The left ventricle is of normal size and function. Cine display demonstrates regional wall motion abnormality including dyskinesis of left ventricular septum, dyskinetic apex and hypokinesis of inferior and inferolateral wall with severe decrease in LV function, LVEF is 21%. The left ventricle is mildly dilated. There is no evidence of left apical thrombus. There is no pericardial effusion. Pulmonary venous return is normal  CALCIUM SCORE (A.U.)    Segment                                Score  --------------------------------------------------  Left Main                                0  Left anterior Descending     0  Circumflex          0  Right                                       0  --------------------------------------------------  Total                                       0    Age/Sex Adjusted Percentile Rating (Pennie, Circulation 2000):   0    IMPRESSION:   CT coronary angiography shows:  LMCA: Normal.  LAD: Widely patent.  LCx: Widely patent.  RCA: Dominant, widely patent proximally and distally. Mid vessel is not well visualized.  Abnormal LV systolic function, LVEF of 21%, regional wall motion abnormalitiesare present.      < end of copied text >

## 2020-03-18 NOTE — ED CDU PROVIDER SUBSEQUENT DAY NOTE - ATTENDING CONTRIBUTION TO CARE
I, Sai Kelly, have personally performed a face to face diagnostic evaluation on this patient. I have reviewed the ACP note and agree with the history, exam and plan of care, except as noted.    51 yo M p/w chest pain. Trop negative x 3. EKG without ischemic changes. ECHO showed wall motion abnormality. pending coronary CTA and cardiology consult.

## 2020-03-18 NOTE — PROGRESS NOTE ADULT - SUBJECTIVE AND OBJECTIVE BOX
Patient with occluded RCA. Chronic total occlusion.     No other CAD.     EF 45%.     Symptoms non cardiac. Start medical therapy with coreg and lisinopril and aspirin and statin.   Follow up with Dr. Julio.   DC home today.

## 2020-03-18 NOTE — ED ADULT NURSE REASSESSMENT NOTE - COMFORT CARE
plan of care explained/pillow provided
darkened lights/repositioned/side rails up/wait time explained/ambulated to bathroom/plan of care explained

## 2020-03-18 NOTE — ED ADULT NURSE REASSESSMENT NOTE - NS ED NURSE REASSESS COMMENT FT1
Patient remains asymptomatic. VSS. Pending CCTA results. NP Cardiology present at the bedside.
Pt alert and oriented. resting in stretcher, no signs of distress noted. Handoff report given to  Lupis Cabral RN. pt's safety maintained. awaiting cards consult. RN with no questions or concerns at this time
Pt care assumed from off going RN, charting as noted. Pt in no apparent distress at this time, airway patent breathing spontaneous and nonlabored. Pt A&Ox4 resting in stretcher. Pt denies any chest pain, SOB or difficulty breathing. Pt reports he is feeling better. Pt awaiting repeat blood work at 2000 and SS cardiology consult.
Pt is resting in bed comfortably at this time, no apparent distress noted at this time. pt safety maintained. Pt denies any complaints at this time. pt provided with sandwich and reeducated on plan of care. pt NPO after midnight, pt verbalizes understanding.
pt handed off to MIGUEL A YORK in stable condition. Pt oriented to unit, plan of care explained. call bell system explained to pt. no apparent distress noted at this time.
pt sleeping in stretcher, no apparent distress noted. bed in lowest position and safety maintained.
assumed pt care from previous Rn Santa Eli.  pt transported to CDU-9 for observation. pt predominately Upper sorbian speaking, assessment done with ED  Gema Mcbride  A&Ox4;  resting in stretcher, with no complaints of pain or discomfort. B/L lungs clear, normal s1&s2 heard on ausculation. (+) pedal pulses, skin warm/dry intact. PIV patent.  remains NSR on cardiac monitor. VSS and documented as per flow sheet. plan of care reviewed and pt verbalizes understanding. bed in lowest position, call bell within reach and safety maintained. monitoring ongoing for any changes.
Assumed care of the patient at 0730. Patient A&Ox4. No s/s of distress or pain. Patient denies CP/SOB or dizziness. NSR on CM. VSS. Ambulatory. PIV patent, +blood return, patent and intact. Patient scheduled for CCTA, remains NPO for the test. Patient in understanding of plan of care. Patient with no further questions for the RN. Resting in comfort. Will continue to monitor.

## 2020-03-18 NOTE — ED CDU PROVIDER SUBSEQUENT DAY NOTE - MEDICAL DECISION MAKING DETAILS
50m with abdominal pain radiating into chest, + wall motion abnormality on TTE. Pt pending cardiac CTA in AM and SS cards final disposition.

## 2020-03-18 NOTE — ED CDU PROVIDER DISPOSITION NOTE - ATTENDING CONTRIBUTION TO CARE
I, Sai Kelly, have personally performed a face to face diagnostic evaluation on this patient. I have reviewed the ACP note and agree with the history, exam and plan of care, except as noted.    51 yo M p/w chest pain. Trop negative x 3. EKG without ischemic changes. ECHO showed wall motion abnormality. coronary CTA showed no calcium score. Patient seen by cariology, will take the patient for catherization. Patient admitted for cath.

## 2020-03-18 NOTE — CONSULT NOTE ADULT - ASSESSMENT
Pt is a 49 y/o male with no pertinent medical history who presents to [facility]ED for headache, chest pain, and stomachache. Pt states yesterday he has been experiencing a various symptoms including headache, stomachache, lower back pain. For past 3-4 days, he c/o chest pain. Chest Pain is described as pinpricking sensation.,mild,nonraidating, intermittent, left sided. Pt states pain is worse at night. Pt denies associated SOB, palpitations, or bilateral leg swelling. Pt came to [facility]-ED, ECG completed revealed SB HR @ 57, diffuse T wave abnormalities, Tropx3 negative, chest xray negative. Pt echo revealed reduced EF of 45-50%. Pt Denies fever, chills, cough, phlegm production, shortness of breath, dyspnea on exertion, orthopnea, PND, edema,  palpitations, irregular, fast heart beat, nausea, vomiting, melena, rectal bleed, hematuria, lightheadedness, dizziness, syncope, near syncope.  Pt currently denies chest pain as of today.    Reduced EF/Chest Pain  -Cardiac CT- RCA- mid vessel not well visualized abnormal LV sys. fx,LVEF-21% regional wall motion abnormalities present  -Xray-negative  -ECG- SB HR @ 57, diffuse T wave abnormalities,  -Tropx3-negative  -Plan for cath today- ischemic vs. nonischemic cardiomyopathy, maintain NPO

## 2020-03-18 NOTE — PROGRESS NOTE ADULT - SUBJECTIVE AND OBJECTIVE BOX
Department of Cardiology                                                                  [facility]/[place] [address] [address] [facility]                                                            Telephone: [phone]. Fax:[phone]                                                                           Cardiac Catheterization Note       Subjective:  50y  Male who had a left heart catheterization which showed (official report pending):       LM: Normal       LAD: Mild luminal irregularities with no flow limiting disturbances.       LCX: Mild luminal irregularities with no flow limiting disturbances.       RCA: Mid vessel  with collaterals from the LCX    PAST MEDICAL & SURGICAL HISTORY:  No Past Medical History  Tooth disorder: s/p extraction    FAMILY HISTORY:  Family history of early CAD: Father    Home Medications: None    Patient is a 50y old  Male who presents with a chief complaint of Chest pain (18 Mar 2020 16:07)    HPI: 49 y/o male former smoker with no sig PMH comes with chest pain & upper abdomen, he thought could be gastritis.  Denies any difficulty breathing or cough.  Pt denies any chest pain with exertion. pt was placed on observation, trop were negative but EF is 45-50% on echo with abnormal cardiac CT.  Positive family hx father developed CAD in his 50s and passed from a heart attack in his 60s. Pt denies any recent travel or known sick contacts. Pt admitted for chest pain for cardiac cath today. (18 Mar 2020 13:04)    General: No fatigue, no fevers/chills  Respiratory: No dyspnea, no cough, no wheeze  CV: No chest pain, no palpitations  Abd: No nausea  Neuro: No headache, no dizziness    No Known Allergies    Objective:  Vital Signs Last 24 Hrs  T(C): 36.6 (18 Mar 2020 14:36), Max: 36.9 (17 Mar 2020 19:44)  T(F): 97.8 (18 Mar 2020 14:36), Max: 98.5 (17 Mar 2020 19:44)  HR: 60 (18 Mar 2020 16:14) (59 - 72)  BP: 107/62 (18 Mar 2020 16:14) (107/62 - 132/69)  BP(mean): 97 (17 Mar 2020 19:44) (97 - 97)  RR: 16 (18 Mar 2020 16:14) (16 - 18)  SpO2: 100% (18 Mar 2020 16:14) (98% - 100%)    General: Awake, alert, speech clear, in no acute distress.  Chest: CTA, S1, S2, no murmurs.  Abdomen, Soft, nondistended  Right Groin: Soft, no bleeding, no hematoma.  Extremities: No edema, + distal pulses.                          14.6   9.44  )-----------( 209      ( 17 Mar 2020 17:11 )             43.1     03-17    136  |  102  |  16.0  ------------------------<  107  3.8   |  22.0  |  0.58    Ca    8.9      17 Mar 2020 17:11    TPro  6.6  /  Alb  4.0  /  TBili  0.4  /  DBili  x   /  AST  33  /  ALT  54  /  AlkPhos  89  03-17 Department of Cardiology                                                                  [facility]/[place] [address] [address] [facility]                                                            Telephone: [phone]. Fax:[phone]                                                                           Cardiac Catheterization Note       Subjective:  50y  Male who had a left heart catheterization which showed (official report pending):       LM: Normal       LAD: Mild luminal irregularities with no flow limiting disturbances.       LCX: Mild luminal irregularities with no flow limiting disturbances.       RCA: Mid vessel  with collaterals from the LCX    PAST MEDICAL & SURGICAL HISTORY:  No Past Medical History  Tooth disorder: s/p extraction    FAMILY HISTORY:  Family history of early CAD: Father    Home Medications: None    Patient is a 50y old  Male who presents with a chief complaint of Chest pain (18 Mar 2020 16:07)    HPI: 49 y/o male former smoker with no sig PMH comes with chest pain & upper abdomen, he thought could be gastritis.  Denies any difficulty breathing or cough.  Pt denies any chest pain with exertion. pt was placed on observation, trop were negative but EF is 45-50% on echo with abnormal cardiac CT.  Positive family hx father developed CAD in his 50s and passed from a heart attack in his 60s. Pt denies any recent travel or known sick contacts. Pt admitted for chest pain for cardiac cath today. (18 Mar 2020 13:04)    General: No fatigue, no fevers/chills  Respiratory: No dyspnea, no cough, no wheeze  CV: No chest pain, no palpitations  Abd: No nausea  Neuro: No headache, no dizziness    No Known Allergies    Objective:  Vital Signs Last 24 Hrs  T(C): 36.6 (18 Mar 2020 14:36), Max: 36.9 (17 Mar 2020 19:44)  T(F): 97.8 (18 Mar 2020 14:36), Max: 98.5 (17 Mar 2020 19:44)  HR: 60 (18 Mar 2020 16:14) (59 - 72)  BP: 107/62 (18 Mar 2020 16:14) (107/62 - 132/69)  BP(mean): 97 (17 Mar 2020 19:44) (97 - 97)  RR: 16 (18 Mar 2020 16:14) (16 - 18)  SpO2: 100% (18 Mar 2020 16:14) (98% - 100%)    General: Awake, alert, speech clear, in no acute distress.  Chest: CTA, S1, S2, no murmurs.  Abdomen, Soft, nondistended  Extremities: Right radial band, no bleeding, fingers warm with good cap refil                          14.6   9.44  )-----------( 209      ( 17 Mar 2020 17:11 )             43.1     03-17    136  |  102  |  16.0  ------------------------<  107  3.8   |  22.0  |  0.58    Ca    8.9      17 Mar 2020 17:11    TPro  6.6  /  Alb  4.0  /  TBili  0.4  /  DBili  x   /  AST  33  /  ALT  54  /  AlkPhos  89  03-17

## 2020-03-18 NOTE — DISCHARGE NOTE PROVIDER - HOSPITAL COURSE
Pt is a 51 y/o male with no pertinent medical history who presents to [facility]-ED for headache, chest pain, and stomachache. Pt states yesterday he has been experiencing a various symptoms including headache, stomachache, lower back pain. For past 3-4 days, he c/o chest pain. Chest Pain is described as pinpricking sensation.,mild,nonraidating, intermittent, left sided. Pt states pain is worse at night. Pt denies associated SOB, palpitations, or bilateral leg swelling. Pt came to [facility]-ED, ECG completed revealed SB HR @ 57, diffuse Twave abnormalities, Tropx3 negative, chest xray negative. Pt echo revealed reduced EF of 45-50%. Pt Denies fever, chills, cough, phlegm production, shortness of breath, dyspnea on exertion, orthopnea, PND, edema,  palpitations, irregular, fast heart beat, nausea, vomiting, melena, rectal bleed, hematuria, lightheadedness, dizziness, syncope, near syncope.   Pt currently denies chest pain as of today. He had a left heart catheterization which showed (official report pending):         LM: Normal         LAD: Mild luminal irregularities with no flow limiting disturbances.         LCX: Mild luminal irregularities with no flow limiting disturbances.         RCA: Mid vessel  with collaterals from the LCX

## 2020-03-18 NOTE — CONSULT NOTE ADULT - ATTENDING COMMENTS
Patient seen and examined by me.  I have discussed my recommendation with the PA which are outlined above.  Will follow. Patient seen and examined by me.  I have discussed my recommendation with the PA which are outlined above.  Start ASA and Coreg and Lisinopril  Patient should be discharged on Coreg 3.125 mg BID and Lisinopril 2.5 mg daily    Will follow.

## 2020-03-18 NOTE — DISCHARGE NOTE PROVIDER - NSDCCPCAREPLAN_GEN_ALL_CORE_FT
PRINCIPAL DISCHARGE DIAGNOSIS  Diagnosis: Chronic total occlusion of native coronary artery  Assessment and Plan of Treatment: Aspirin, Lipitor

## 2020-03-18 NOTE — H&P ADULT - ASSESSMENT
51 y/o male with no sig PMH comes with chest pain & upper abdomen, he thought could be gastritis.  Denies any difficulty breathing or cough.  Pt denies any chest pain with exertion. pt was placed on observation, trop were negative but EF is 45-50% on echo with abnormal cardiac CT.  Positive family hx father developed CAD in his 50s and passed from a heart attack in his 60s. Pt denies any recent travel or known sick contacts. Pt admitted for chest pain for cardiac cath today.     1) Chest pain rule out ACS  - admit to medicine, monitored bed  - for cardiac cath today per cardio Dr. Julio  - Echo shows EF 45-50%  - abnormal cardiac CT angio  - trop negative

## 2020-03-18 NOTE — H&P ADULT - NSHPPHYSICALEXAM_GEN_ALL_CORE
PHYSICAL EXAM:  Vital Signs Last 24 Hrs  T(C): 36.9 (18 Mar 2020 11:37), Max: 36.9 (17 Mar 2020 19:44)  T(F): 98.5 (18 Mar 2020 11:37), Max: 98.5 (17 Mar 2020 19:44)  HR: 62 (18 Mar 2020 11:37) (62 - 80)  BP: 107/64 (18 Mar 2020 11:37) (107/64 - 145/86)  BP(mean): 97 (17 Mar 2020 19:44) (97 - 97)  RR: 18 (18 Mar 2020 11:37) (17 - 18)  SpO2: 99% (18 Mar 2020 11:37) (98% - 99%)    GENERAL: NAD, well-groomed, well-developed  HEAD:  Atraumatic, Normocephalic  EYES: EOMI, PERRLA, conjunctiva and sclera clear  ENMT: No tonsillar erythema, exudates, or enlargement; Moist mucous membranes  NERVOUS SYSTEM:  Alert & Oriented X3, Good concentration; Motor Strength 5/5 B/L upper and lower extremities;  CHEST/LUNG: Clear to auscultation bilaterally; No rales, rhonchi, wheezing  HEART: Regular rate and rhythm; No murmurs  ABDOMEN: Soft, Nontender, Nondistended; Bowel sounds present  EXTREMITIES:  2+ Peripheral Pulses, No clubbing, cyanosis, or edema

## 2020-03-18 NOTE — H&P ADULT - HISTORY OF PRESENT ILLNESS
51 y/o male with no sig PMH comes with chest pain & upper abdomen, he thought could be gastritis.  Denies any difficulty breathing or cough.  Pt denies any chest pain with exertion. pt was placed on observation, trop were negative but EF is 45-50% on echo with abnormal cardiac CT.  Positive family hx father developed CAD in his 50s and passed from a heart attack in his 60s. Pt denies any recent travel or known sick contacts. Pt admitted for chest pain for cardiac cath today.

## 2021-06-13 NOTE — ED ADULT NURSE NOTE - NS ED NURSE LEVEL OF CONSCIOUSNESS AFFECT
Cont PO Iron  No signs of bleeding reported  Labs  EGD and colonoscopy is still advised   Appropriate

## 2021-06-30 NOTE — ED ADULT TRIAGE NOTE - NS ED NURSE BANDS TYPE
Name band; pt hypoglycemic to 59. pt asymptomatic all VSS/ HR-69 BP-137/64 spo2-99% on RA and RR 19. pt given apple juice and gingerale and repeat FS remains at 59. 1 amp D50% given and repeat blood glucose 326.

## 2023-08-18 ENCOUNTER — OFFICE (OUTPATIENT)
Dept: URBAN - METROPOLITAN AREA CLINIC 94 | Facility: CLINIC | Age: 53
Setting detail: OPHTHALMOLOGY
End: 2023-08-18

## 2023-08-18 ENCOUNTER — OFFICE (OUTPATIENT)
Dept: URBAN - METROPOLITAN AREA CLINIC 40 | Facility: CLINIC | Age: 53
Setting detail: OPHTHALMOLOGY
End: 2023-08-18

## 2023-08-18 DIAGNOSIS — H40.1113: ICD-10-CM

## 2023-08-18 DIAGNOSIS — H25.11: ICD-10-CM

## 2023-08-18 DIAGNOSIS — H25.21: ICD-10-CM

## 2023-08-18 DIAGNOSIS — H11.151: ICD-10-CM

## 2023-08-18 DIAGNOSIS — H25.12: ICD-10-CM
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
## 2023-08-18 DIAGNOSIS — H40.1121: ICD-10-CM

## 2023-08-18 DIAGNOSIS — H25.13: ICD-10-CM

## 2023-08-18 DIAGNOSIS — H04.123: ICD-10-CM

## 2023-08-18 PROCEDURE — 92136 OPHTHALMIC BIOMETRY: CPT | Performed by: OPHTHALMOLOGY

## 2023-08-18 PROCEDURE — 55555 MISCELLANEOUS CHARGES: CPT | Performed by: PHYSICIAN ASSISTANT

## 2023-08-18 PROCEDURE — 99214 OFFICE O/P EST MOD 30 MIN: CPT | Performed by: OPHTHALMOLOGY

## 2023-08-18 ASSESSMENT — PACHYMETRY
OS_CT_UM: 600
OD_CT_CORRECTION: -4
OD_CT_UM: 601
OS_CT_CORRECTION: -4

## 2023-08-18 ASSESSMENT — KERATOMETRY
OS_K1POWER_DIOPTERS: 40.75
OD_K1K2_AVERAGE: 40.75
OS_K1POWER_DIOPTERS: 40.75
OS_K2POWER_DIOPTERS: 41.00
OD_K2POWER_DIOPTERS: 41.00
OS_K2POWER_DIOPTERS: 41.00
OS_AXISANGLE2_DEGREES: 047
OS_AXISANGLE_DEGREES: 137
OS_K1K2_AVERAGE: 40.875
METHOD_AUTO_MANUAL: AUTO
OD_CYLPOWER_DEGREES: 0.5
OD_AXISANGLE_DEGREES: 12
OD_K1POWER_DIOPTERS: 40.50
OD_K1POWER_DIOPTERS: 40.50
OD_AXISANGLE_DEGREES: 102
OD_K2POWER_DIOPTERS: 41.00
OD_CYLAXISANGLE_DEGREES: 102
OS_CYLAXISANGLE_DEGREES: 047
OS_CYLPOWER_DEGREES: 0.25
OD_AXISANGLE2_DEGREES: 102
OS_AXISANGLE_DEGREES: 047

## 2023-08-18 ASSESSMENT — CONFRONTATIONAL VISUAL FIELD TEST (CVF)
OD_FINDINGS: FULL
OS_FINDINGS: FULL

## 2023-08-18 ASSESSMENT — REFRACTION_AUTOREFRACTION
OS_SPHERE: +0.50
OD_AXIS: 075
OD_CYLINDER: -0.75
OS_AXIS: 075
OS_CYLINDER: -0.75
OD_SPHERE: -7.75

## 2023-08-18 ASSESSMENT — REFRACTION_MANIFEST
OD_SPHERE: -7.75
OD_VA1: 20/300
OD_CYLINDER: -2.00
OS_AXIS: 075
OD_AXIS: 075
OS_CYLINDER: -0.75
OS_VA1: 20/30
OD_CYLINDER: -0.75
OD_AXIS: 90
OD_SPHERE: +2.00
OS_CYLINDER: -0.75
OS_SPHERE: +0.50
OS_VA1: 20/40+
OS_AXIS: 70
OS_SPHERE: -0.50
OD_VA1: 20/350

## 2023-08-18 ASSESSMENT — AXIALLENGTH_DERIVED
OD_AL: 28.67
OD_AL: 24.2292
OS_AL: 24.5443
OD_AL: 28.67
OS_AL: 24.9734
OS_AL: 24.5443

## 2023-08-18 ASSESSMENT — SPHEQUIV_DERIVED
OD_SPHEQUIV: 1
OD_SPHEQUIV: -8.125
OS_SPHEQUIV: 0.125
OS_SPHEQUIV: -0.875
OD_SPHEQUIV: -8.125
OS_SPHEQUIV: 0.125

## 2023-08-18 ASSESSMENT — TONOMETRY: OS_IOP_MMHG: 18

## 2023-08-18 ASSESSMENT — VISUAL ACUITY
OS_BCVA: 20/350
OD_BCVA: 20/30

## 2023-08-18 ASSESSMENT — SUPERFICIAL PUNCTATE KERATITIS (SPK)
OS_SPK: 1+
OD_SPK: 1+

## 2023-10-04 ENCOUNTER — ASC (OUTPATIENT)
Dept: URBAN - METROPOLITAN AREA SURGERY 8 | Facility: SURGERY | Age: 53
Setting detail: OPHTHALMOLOGY
End: 2023-10-04

## 2023-10-04 DIAGNOSIS — H52.211: ICD-10-CM

## 2023-10-04 DIAGNOSIS — H25.11: ICD-10-CM

## 2023-10-04 PROCEDURE — FEMTO FEMTOSECOND LASER: Mod: GY | Performed by: OPHTHALMOLOGY

## 2023-10-04 PROCEDURE — 66984 XCAPSL CTRC RMVL W/O ECP: CPT | Mod: RT | Performed by: OPHTHALMOLOGY

## 2023-10-05 ENCOUNTER — RX ONLY (RX ONLY)
Age: 53
End: 2023-10-05

## 2023-10-05 ENCOUNTER — OFFICE (OUTPATIENT)
Dept: URBAN - METROPOLITAN AREA CLINIC 112 | Facility: CLINIC | Age: 53
Setting detail: OPHTHALMOLOGY
End: 2023-10-05

## 2023-10-05 DIAGNOSIS — Z96.1: ICD-10-CM

## 2023-10-05 PROCEDURE — 99024 POSTOP FOLLOW-UP VISIT: CPT | Performed by: PHYSICIAN ASSISTANT

## 2023-10-05 ASSESSMENT — SPHEQUIV_DERIVED
OS_SPHEQUIV: 0.125
OS_SPHEQUIV: 0
OS_SPHEQUIV: -0.875
OD_SPHEQUIV: 1
OD_SPHEQUIV: -8.125

## 2023-10-05 ASSESSMENT — TONOMETRY
OS_IOP_MMHG: 12
OS_IOP_MMHG: 15
OD_IOP_MMHG: 17
OD_IOP_MMHG: 20

## 2023-10-05 ASSESSMENT — CONFRONTATIONAL VISUAL FIELD TEST (CVF)
OS_FINDINGS: FULL
OD_FINDINGS: FULL

## 2023-10-05 ASSESSMENT — REFRACTION_MANIFEST
OS_CYLINDER: -0.75
OS_CYLINDER: -0.75
OD_SPHERE: -7.75
OS_AXIS: 075
OD_VA1: 20/300
OD_CYLINDER: -0.75
OS_SPHERE: +0.50
OS_AXIS: 70
OS_VA1: 20/30
OS_SPHERE: -0.50
OD_CYLINDER: -2.00
OD_AXIS: 075
OS_VA1: 20/40+
OD_VA1: 20/350
OD_SPHERE: +2.00
OD_AXIS: 90

## 2023-10-05 ASSESSMENT — PACHYMETRY
OD_CT_UM: 601
OD_CT_CORRECTION: -4
OS_CT_UM: 600
OS_CT_CORRECTION: -4

## 2023-10-05 ASSESSMENT — CORNEAL EDEMA - FOLDS/STRIAE: OD_FOLDSSTRIAE: T 1+

## 2023-10-05 ASSESSMENT — KERATOMETRY
OD_AXISANGLE_DEGREES: 037
OS_AXISANGLE_DEGREES: 119
OD_K1POWER_DIOPTERS: 41.25
OS_K2POWER_DIOPTERS: 41.00
OS_K1POWER_DIOPTERS: 40.75
OD_K2POWER_DIOPTERS: 41.50
METHOD_AUTO_MANUAL: AUTO

## 2023-10-05 ASSESSMENT — VISUAL ACUITY
OD_BCVA: 20/20
OS_BCVA: 20/80-1

## 2023-10-05 ASSESSMENT — AXIALLENGTH_DERIVED
OS_AL: 24.5443
OS_AL: 24.9734
OD_AL: 28.3306
OD_AL: 23.9894
OS_AL: 24.5972

## 2023-10-05 ASSESSMENT — REFRACTION_AUTOREFRACTION
OD_SPHERE: ERROR
OS_CYLINDER: -0.50
OS_AXIS: 076
OS_SPHERE: +0.25

## 2023-10-05 ASSESSMENT — SUPERFICIAL PUNCTATE KERATITIS (SPK)
OD_SPK: 1+
OS_SPK: 1+

## 2023-10-05 ASSESSMENT — CORNEAL EDEMA CLINICAL DESCRIPTION: OD_CORNEALEDEMA: 1+

## 2023-10-07 ENCOUNTER — OFFICE (OUTPATIENT)
Dept: URBAN - METROPOLITAN AREA CLINIC 94 | Facility: CLINIC | Age: 53
Setting detail: OPHTHALMOLOGY
End: 2023-10-07

## 2023-10-07 DIAGNOSIS — H26.491: ICD-10-CM

## 2023-10-07 DIAGNOSIS — H40.1113: ICD-10-CM

## 2023-10-07 DIAGNOSIS — H40.1121: ICD-10-CM

## 2023-10-07 DIAGNOSIS — Z96.1: ICD-10-CM

## 2023-10-07 PROCEDURE — 99024 POSTOP FOLLOW-UP VISIT: CPT | Performed by: REGISTERED NURSE

## 2023-10-07 ASSESSMENT — KERATOMETRY
METHOD_AUTO_MANUAL: AUTO
OD_K2POWER_DIOPTERS: 41.50
OS_K1POWER_DIOPTERS: 40.75
OD_AXISANGLE_DEGREES: 037
OS_K2POWER_DIOPTERS: 41.00
OS_AXISANGLE_DEGREES: 119
OD_K1POWER_DIOPTERS: 41.25

## 2023-10-07 ASSESSMENT — CONFRONTATIONAL VISUAL FIELD TEST (CVF)
OD_FINDINGS: FULL
OS_FINDINGS: FULL

## 2023-10-07 ASSESSMENT — CORNEAL EDEMA CLINICAL DESCRIPTION: OD_CORNEALEDEMA: T

## 2023-10-07 ASSESSMENT — SUPERFICIAL PUNCTATE KERATITIS (SPK)
OD_SPK: 1+
OS_SPK: 1+

## 2023-10-07 ASSESSMENT — TONOMETRY
OS_IOP_MMHG: 17
OD_IOP_MMHG: 16
OD_IOP_MMHG: 12

## 2023-10-07 ASSESSMENT — PACHYMETRY
OD_CT_UM: 601
OS_CT_CORRECTION: -4
OS_CT_UM: 600
OD_CT_CORRECTION: -4

## 2023-10-07 ASSESSMENT — REFRACTION_AUTOREFRACTION
OS_AXIS: 076
OS_CYLINDER: -0.50
OS_SPHERE: +0.25

## 2023-10-07 ASSESSMENT — AXIALLENGTH_DERIVED: OS_AL: 24.5972

## 2023-10-07 ASSESSMENT — SPHEQUIV_DERIVED: OS_SPHEQUIV: 0

## 2023-10-07 ASSESSMENT — VISUAL ACUITY
OS_BCVA: 20/40
OD_BCVA: 20/20-1

## 2023-10-07 ASSESSMENT — CORNEAL EDEMA - FOLDS/STRIAE: OD_FOLDSSTRIAE: T

## 2023-10-16 ENCOUNTER — OFFICE (OUTPATIENT)
Dept: URBAN - METROPOLITAN AREA CLINIC 112 | Facility: CLINIC | Age: 53
Setting detail: OPHTHALMOLOGY
End: 2023-10-16

## 2023-10-16 DIAGNOSIS — H40.1113: ICD-10-CM

## 2023-10-16 DIAGNOSIS — H26.491: ICD-10-CM

## 2023-10-16 DIAGNOSIS — Z96.1: ICD-10-CM

## 2023-10-16 DIAGNOSIS — H40.1121: ICD-10-CM

## 2023-10-16 DIAGNOSIS — H25.12: ICD-10-CM

## 2023-10-16 PROCEDURE — 99024 POSTOP FOLLOW-UP VISIT: CPT | Performed by: OPHTHALMOLOGY

## 2023-10-16 ASSESSMENT — SUPERFICIAL PUNCTATE KERATITIS (SPK)
OS_SPK: 1+
OD_SPK: 1+

## 2023-10-16 ASSESSMENT — CORNEAL EDEMA CLINICAL DESCRIPTION: OD_CORNEALEDEMA: T

## 2023-10-16 ASSESSMENT — REFRACTION_AUTOREFRACTION
OD_AXIS: 092
OS_SPHERE: -0.25
OD_SPHERE: -0.25
OS_CYLINDER: -0.25
OS_AXIS: 089
OD_CYLINDER: -0.50

## 2023-10-16 ASSESSMENT — REFRACTION_MANIFEST
OD_SPHERE: -0.25
OD_CYLINDER: -0.50
OS_VA1: 20/25
OS_CYLINDER: -0.25
OS_AXIS: 089
OD_VA1: 20/30
OD_AXIS: 092
OS_SPHERE: -0.25

## 2023-10-16 ASSESSMENT — AXIALLENGTH_DERIVED
OS_AL: 24.6563
OD_AL: 24.9127
OS_AL: 24.6563
OD_AL: 24.9127

## 2023-10-16 ASSESSMENT — SPHEQUIV_DERIVED
OS_SPHEQUIV: -0.375
OD_SPHEQUIV: -0.5
OS_SPHEQUIV: -0.375
OD_SPHEQUIV: -0.5

## 2023-10-16 ASSESSMENT — VISUAL ACUITY
OS_BCVA: 20/30
OD_BCVA: 20/25

## 2023-10-16 ASSESSMENT — PACHYMETRY
OS_CT_CORRECTION: -4
OD_CT_CORRECTION: -4
OD_CT_UM: 601
OS_CT_UM: 600

## 2023-10-16 ASSESSMENT — KERATOMETRY
METHOD_AUTO_MANUAL: AUTO
OS_AXISANGLE_DEGREES: 084
OS_K1POWER_DIOPTERS: 41.00
OD_K2POWER_DIOPTERS: 40.75
OD_AXISANGLE_DEGREES: 173
OD_K1POWER_DIOPTERS: 40.50
OS_K2POWER_DIOPTERS: 41.25

## 2023-10-16 ASSESSMENT — TONOMETRY
OD_IOP_MMHG: 18
OS_IOP_MMHG: 12

## 2023-10-16 ASSESSMENT — CORNEAL EDEMA - FOLDS/STRIAE: OD_FOLDSSTRIAE: T

## 2023-10-16 ASSESSMENT — CONFRONTATIONAL VISUAL FIELD TEST (CVF)
OS_FINDINGS: FULL
OD_FINDINGS: FULL

## 2023-10-17 NOTE — ED ADULT NURSE NOTE - EXTENSIONS OF SELF_ADULT
"History of \"allergic reaction\" to atorvostatin  Patient declined addition of any statin therapy stating \"the statin studies done in [place] do not have good enough evidence\"   Repeat ,  improved compared to prior (patient reports having made an effort to manage cholesterol through diet)     " None

## 2023-11-10 ENCOUNTER — OFFICE (OUTPATIENT)
Dept: URBAN - METROPOLITAN AREA CLINIC 94 | Facility: CLINIC | Age: 53
Setting detail: OPHTHALMOLOGY
End: 2023-11-10

## 2023-11-10 DIAGNOSIS — H25.12: ICD-10-CM

## 2023-11-10 DIAGNOSIS — H26.491: ICD-10-CM

## 2023-11-10 DIAGNOSIS — Z96.1: ICD-10-CM

## 2023-11-10 DIAGNOSIS — H40.1121: ICD-10-CM

## 2023-11-10 DIAGNOSIS — H40.1113: ICD-10-CM

## 2023-11-10 PROCEDURE — 99024 POSTOP FOLLOW-UP VISIT: CPT | Performed by: OPHTHALMOLOGY

## 2023-11-10 ASSESSMENT — REFRACTION_AUTOREFRACTION
OS_SPHERE: +0.25
OS_AXIS: 091
OS_CYLINDER: -0.75
OD_CYLINDER: -0.50
OD_SPHERE: -0.50
OD_AXIS: 096

## 2023-11-10 ASSESSMENT — REFRACTION_MANIFEST
OS_SPHERE: -0.25
OS_AXIS: 089
OS_VA1: 20/25
OD_CYLINDER: -0.50
OS_CYLINDER: -0.25
OD_VA1: 20/30
OD_AXIS: 092
OD_SPHERE: -0.25

## 2023-11-10 ASSESSMENT — CORNEAL EDEMA CLINICAL DESCRIPTION: OD_CORNEALEDEMA: T

## 2023-11-10 ASSESSMENT — SPHEQUIV_DERIVED
OD_SPHEQUIV: -0.75
OS_SPHEQUIV: -0.125
OS_SPHEQUIV: -0.375
OD_SPHEQUIV: -0.5

## 2023-11-10 ASSESSMENT — SUPERFICIAL PUNCTATE KERATITIS (SPK)
OS_SPK: 1+
OD_SPK: 1+

## 2023-11-10 ASSESSMENT — CONFRONTATIONAL VISUAL FIELD TEST (CVF)
OD_FINDINGS: FULL
OS_FINDINGS: FULL

## 2023-11-10 ASSESSMENT — CORNEAL EDEMA - FOLDS/STRIAE: OD_FOLDSSTRIAE: T

## 2024-02-09 ENCOUNTER — OFFICE (OUTPATIENT)
Dept: URBAN - METROPOLITAN AREA CLINIC 94 | Facility: CLINIC | Age: 54
Setting detail: OPHTHALMOLOGY
End: 2024-02-09
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
## 2024-02-09 PROCEDURE — NO SHOW FE NO SHOW FEE: Performed by: OPHTHALMOLOGY
